# Patient Record
Sex: MALE | Race: WHITE | NOT HISPANIC OR LATINO | Employment: UNEMPLOYED | URBAN - METROPOLITAN AREA
[De-identification: names, ages, dates, MRNs, and addresses within clinical notes are randomized per-mention and may not be internally consistent; named-entity substitution may affect disease eponyms.]

---

## 2020-06-02 ENCOUNTER — OUTSIDE PLACE OF SERVICE (OUTPATIENT)
Dept: ADMINISTRATIVE | Facility: OTHER | Age: 50
End: 2020-06-02
Payer: MEDICAID

## 2020-06-02 PROCEDURE — 99233 PR SUBSEQUENT HOSPITAL CARE,LEVL III: ICD-10-PCS | Mod: ,,, | Performed by: PHYSICIAN ASSISTANT

## 2020-06-02 PROCEDURE — 99233 SBSQ HOSP IP/OBS HIGH 50: CPT | Mod: ,,, | Performed by: PHYSICIAN ASSISTANT

## 2020-06-09 DIAGNOSIS — U07.1 COVID-19 VIRUS DETECTED: ICD-10-CM

## 2020-06-11 ENCOUNTER — TELEPHONE (OUTPATIENT)
Dept: VASCULAR SURGERY | Facility: CLINIC | Age: 50
End: 2020-06-11

## 2020-06-11 NOTE — TELEPHONE ENCOUNTER
Calling to schedule lung surgery, seen by Dr Maurice in [place].  Informed Dr Maurice returns to clinic next week, will call him back no later than 6/22 with Dr Maurice's recommendations.  Voices understanding and is agreeable.

## 2020-06-11 NOTE — TELEPHONE ENCOUNTER
----- Message from Bee Barton sent at 6/11/2020 11:46 AM CDT -----  Contact: Patient  Type: Needs Medical Advice  Who Called: Patient  Best Call Back Number:[phone] sushant this number  Additional Information: The patient said he was in the hosp and was told to see the Dr he said he only has medicaid as his ins but needs surgery please call to advise. Thanks.

## 2021-06-11 ENCOUNTER — HOSPITAL ENCOUNTER (EMERGENCY)
Facility: HOSPITAL | Age: 51
Discharge: HOME | End: 2021-06-11
Attending: EMERGENCY MEDICINE
Payer: MEDICAID

## 2021-06-11 VITALS
HEIGHT: 74 IN | DIASTOLIC BLOOD PRESSURE: 95 MMHG | OXYGEN SATURATION: 96 % | TEMPERATURE: 98 F | WEIGHT: 191.5 LBS | BODY MASS INDEX: 24.58 KG/M2 | HEART RATE: 125 BPM | RESPIRATION RATE: 18 BRPM | SYSTOLIC BLOOD PRESSURE: 137 MMHG

## 2021-06-11 DIAGNOSIS — L30.9 DERMATITIS: ICD-10-CM

## 2021-06-11 DIAGNOSIS — L29.9 PRURITUS: Primary | ICD-10-CM

## 2021-06-11 PROCEDURE — 99284 EMERGENCY DEPT VISIT MOD MDM: CPT

## 2021-06-11 RX ORDER — HYDROXYZINE HYDROCHLORIDE 25 MG/1
25 TABLET, FILM COATED ORAL EVERY 6 HOURS
Qty: 12 TABLET | Refills: 0 | Status: SHIPPED | OUTPATIENT
Start: 2021-06-11

## 2021-06-11 RX ORDER — MUPIROCIN 20 MG/G
OINTMENT TOPICAL 3 TIMES DAILY
Qty: 22 G | Refills: 0 | Status: SHIPPED | OUTPATIENT
Start: 2021-06-11

## 2022-07-10 ENCOUNTER — HOSPITAL ENCOUNTER (EMERGENCY)
Facility: HOSPITAL | Age: 52
Discharge: HOME | End: 2022-07-10
Attending: EMERGENCY MEDICINE
Payer: MEDICAID

## 2022-07-10 VITALS
OXYGEN SATURATION: 95 % | TEMPERATURE: 98 F | RESPIRATION RATE: 20 BRPM | HEIGHT: 75 IN | BODY MASS INDEX: 27.96 KG/M2 | HEART RATE: 72 BPM | WEIGHT: 224.88 LBS | SYSTOLIC BLOOD PRESSURE: 119 MMHG | DIASTOLIC BLOOD PRESSURE: 73 MMHG

## 2022-07-10 DIAGNOSIS — S09.90XA TRAUMATIC INJURY OF HEAD, INITIAL ENCOUNTER: ICD-10-CM

## 2022-07-10 DIAGNOSIS — T75.4XXA: ICD-10-CM
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
## 2022-07-10 DIAGNOSIS — S09.93XA FACIAL INJURY, INITIAL ENCOUNTER: Primary | ICD-10-CM

## 2022-07-10 DIAGNOSIS — T14.8XXA ABRASION: ICD-10-CM

## 2022-07-10 PROCEDURE — 99284 EMERGENCY DEPT VISIT MOD MDM: CPT | Mod: 25

## 2022-07-10 PROCEDURE — 93010 EKG 12-LEAD: ICD-10-PCS | Mod: ,,, | Performed by: INTERNAL MEDICINE

## 2022-07-10 PROCEDURE — 93005 ELECTROCARDIOGRAM TRACING: CPT

## 2022-07-10 PROCEDURE — 93010 ELECTROCARDIOGRAM REPORT: CPT | Mod: ,,, | Performed by: INTERNAL MEDICINE
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
